# Patient Record
Sex: MALE | URBAN - METROPOLITAN AREA
[De-identification: names, ages, dates, MRNs, and addresses within clinical notes are randomized per-mention and may not be internally consistent; named-entity substitution may affect disease eponyms.]

---

## 2017-05-23 ENCOUNTER — TELEPHONE (OUTPATIENT)
Dept: NURSING | Facility: CLINIC | Age: 37
End: 2017-05-23

## 2022-05-09 NOTE — TELEPHONE ENCOUNTER
Call Type: Triage Call    Presenting Problem: Stiff, sore neck starting this AM upon awakening.  Can touch chin to chest w/ no difficulty it is side to side neck  movement that hurts and is difficult. Got new pillow 3 nights ago  but no problem until today. No fever, no severe headache, no  vomiting. A/Ox3. No neck spasms, pain constant Rates 5/10 severity.  Ibuprofen 200mg gives little to no relief. Advised see provider w/i  24 to 72hrs. Disc'd home care per guideline. Pt will see his doctor  tomorrow.Will c/b if new/worse sx chuyita fever, severe HA.  Triage Note:  Guideline Title: Neck Pain  Recommended Disposition: See Provider within 72 Hours  Original Inclination: Wanted to speak with a nurse  Override Disposition:  Intended Action: See /Royal Appt  Physician Contacted: No  Neck pain and decreased range of motion in neck, shoulder, or arm AND has not  responded to 48 hours of home care ?  YES  Painful involuntary spasm of neck or jaw WITHOUT injury ? NO  Severe breathing problems ? NO  Injury to the neck ? NO  New or worsening signs and symptoms that may indicate shock ? NO  Neck mass/swelling ? NO  Head injury ? NO  Neck pain (no injury) AND any temperature elevation in an immunocompromised  individual or frail elderly ? NO  Choking sensation, cannot swallow own saliva with associated drooling and soft  muffled voice ? NO  Seizure now or within last 6 hours ? NO  New onset or unexplained change in bowel or bladder control (unable to urinate and  full feeling or loss of control of bowel or bladder) ? NO  Any other cardiac signs/symptoms for more than 5 minutes, now or within last hour.  Pain is NOT associated with taking a deep breath or a productive cough, movement,  or touch to a localized area on the chest or upper body. ? NO  Spasm or pain WITHOUT injury AND current or recent use of phenothiazines  (Compazine, Thorazine, Mellaril, Prolixin, Loxitane, Haldol, etc.) ? NO  Neck pain AND symptoms of viral illness  "that are not improved OR are getting worse  with 24 hours of home care ? NO  Sudden, severe disabling head pain OR caller spontaneously verbalizes \"worst  headache of my life\" ? NO  New onset of neck pain with forward head movement (no injury) AND severe  generalized headache, fever, or altered mental status ? NO  New onset of unbearable pain within last 24 hours ? NO  Unexplained blood-colored (purple or red) flat pinpoint dots, spots or patches on  the skin ? NO  Physician Instructions:  Care Advice: Call provider if symptoms worsen or new symptoms develop.  Maintain good posture. Avoid putting pressure on a nerve by not carrying  heavy objects such as computer case or backpack. Avoid overuse activities -  alternate activities by switching sides and limit length of activity. Avoid  lifting heavy objects.  CAUTIONS  SYMPTOM / CONDITION MANAGEMENT  For some relief, try using a heating pad on low or medium for 15 - 20  minutes every 2 or 3 hours or take a warm shower.  A microwave heating pad  can also be used to provide warm moist heat.  Analgesic/Antipyretic Advice - NSAIDs: Consider aspirin, ibuprofen,  naproxen or ketoprofen for pain or fever as directed on label or by  pharmacist/provider. PRECAUTIONS: - You should not take this medicine for  more than 10 days unless recommended by your provider. EXCEPTIONS: - Should  not be used if taking blood thinners or have bleeding problems. - Do not  use if have history of sensitivity/allergy to any of these medications  or history of cardiovascular, ulcer, kidney, liver disease or diabetes  unless approved by provider. - Do not exceed recommended dose or frequency.  Analgesic/Antipyretic Advice - Acetaminophen: Consider acetaminophen as  directed on label or by pharmacist/provider for pain or fever. PRECAUTIONS:  - Use if there is no history of liver disease, alcoholism, or intake of  three or more alcohol drinks per day - Only if approved by provider during  pregnancy " or when breastfeeding - Do not exceed recommended dose or  frequency. Do not take more than 3000 milligrams (mg) in 24 hours. Do not  take this medicine for more than 10 days unless recommended by your  provider. - During pregnancy, acetaminophen should not be taken more than 3  consecutive days without telling provider - To make sure you don't take too  much, check other medicines you take to see if they also contain  acetaminophen.   Never smoker